# Patient Record
Sex: MALE | Race: BLACK OR AFRICAN AMERICAN
[De-identification: names, ages, dates, MRNs, and addresses within clinical notes are randomized per-mention and may not be internally consistent; named-entity substitution may affect disease eponyms.]

---

## 2019-03-28 ENCOUNTER — HOSPITAL ENCOUNTER (EMERGENCY)
Dept: HOSPITAL 74 - FER | Age: 29
Discharge: HOME | End: 2019-03-28
Payer: COMMERCIAL

## 2019-03-28 VITALS — HEART RATE: 85 BPM | SYSTOLIC BLOOD PRESSURE: 150 MMHG | DIASTOLIC BLOOD PRESSURE: 98 MMHG | TEMPERATURE: 98.5 F

## 2019-03-28 VITALS — BODY MASS INDEX: 33.2 KG/M2
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
## 2019-03-28 DIAGNOSIS — S43.402A: Primary | ICD-10-CM
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
## 2019-03-28 NOTE — PDOC
History of Present Illness





- General


Chief Complaint: Pain, Acute


Stated Complaint: LEFT SHOULDER PAIN


Time Seen by Provider: 03/28/19 09:48


History Source: Patient


Exam Limitations: No Limitations





- History of Present Illness


Initial Comments: 





03/28/19 10:14


29 yo male pmh s/p Achilles tendon repair (full rupture while playing basketball

) presents to the ED with 2 weeks of intermittent left shoulder pain that has 

increased in intensity and become constant over the last 4 days. Pt states he 

was playing basketball 1.5 weeks ago, when he had a fall and another player 

bumped the back of his shoulder leading to sudden onset worsening of pain 

however, pt was able to continue play basketball without difficulty. Pain is 

made worse with shoulder flexion past 90 degrees and tried NSAID medications 

yesterday which relieved pain. Denies coldness/numbness or weakness into the 

left lower arm or hand, denies warmth/swelling, recent illness, F/C/N/V 














Past History





- Past Medical History


Allergies/Adverse Reactions: 


 Allergies











Allergy/AdvReac Type Severity Reaction Status Date / Time


 


No Known Allergies Allergy   Verified 03/28/19 09:45











Home Medications: 


Ambulatory Orders





NK [No Known Home Medication]  03/28/19 








COPD: No





- Immunization History


Immunization Up to Date: No





- Suicide/Smoking/Psychosocial Hx


Smoking Status: No


Smoking History: Never smoked


Have you smoked in the past 12 months: No


Number of Cigarettes Smoked Daily: 0


Hx Alcohol Use: Yes (OCASIONAL)


Drug/Substance Use Hx: No


Substance Use Type: None, Alcohol





**Review of Systems





- Review of Systems


Constitutional: No: Chills, Fever


Respiratory: No: Shortness of Breath


Cardiac (ROS): No: Chest Pain


ABD/GI: No: Nausea, Vomiting


Musculoskeletal: Yes: Other (left shoulder pain)


Integumentary: No: Bruising, Change in Color


Neurological: No: Numbness, Tingling, Weakness





*Physical Exam





- Vital Signs


 Last Vital Signs











Temp Pulse Resp BP Pulse Ox


 


 98.5 F   85   20   150/98   100 


 


 03/28/19 09:45  03/28/19 09:45  03/28/19 09:45  03/28/19 09:45  03/28/19 09:45














- Physical Exam


General Appearance: Yes: Nourished, Appropriately Dressed.  No: Apparent 

Distress


HEENT: positive: EOMI


Neck: negative: Tender, Tender lateral, Tender midline


Respiratory/Chest: positive: Lungs Clear, Normal Breath Sounds.  negative: 

Accessory Muscle Use


Cardiovascular: positive: Regular Rhythm, Regular Rate, S1, S2.  negative: Edema

, JVD, Murmur


Vascular Pulses: Dorsalis-Pedis (R): 4+, Doralis-Pedis (L): 4+


Gastrointestinal/Abdominal: positive: Flat, Soft.  negative: Pulsatile Mass


Musculoskeletal: positive: Normal Inspection


Extremity: positive: Normal Capillary Refill.  negative: Normal Range of Motion 

(AROM reduced in shoulder flexion, however, PROM leads to equal ROM when 

compared to right arm), Tender


Integumentary: positive: Normal Color, Dry, Warm


Neurologic: positive: Fully Oriented, Alert, Normal Mood/Affect, Normal Response

, Motor Strength 5/5





Medical Decision Making





- Medical Decision Making





29 yo male presents to ED with worsening left shoulder pain with shoulder 

flexion. AROM reduced due to pain however, PROM pt able to match R shoulder and 

arm actions without difficulty or pain. Neurovascularly intact, strength equal 

bilaterally, no deformities visualized or palpable on exam





Vitals stable 





AOX3, NAD





Discussed possibility of x ray for shoulder with pt, combined decision making 

led to no x ray to be taken on todays visit. Pt likely suffering from a muscle 

strain/sprain and x ray will not assist in diagnosis. Pt agrees with plan to 

rest the arm, ice, continue taking NSAIDs for pain and slowly stretch/exercise 

the arm. If pain and function continues to be a concern, an Orthopedic Doctor 

was referred to pt





Pt agrees with and understands plan








*DC/Admit/Observation/Transfer


Diagnosis at time of Disposition: 


Left shoulder strain


Qualifiers:


 Encounter type: initial encounter Qualified Code(s): S46.912A - Strain of 

unspecified muscle, fascia and tendon at shoulder and upper arm level, left arm

, initial encounter








- Discharge Dispostion


Disposition: HOME


Condition at time of disposition: Good


Decision to Admit order: No





- Referrals


Referrals: 


Brandin Gonzales DO [Staff Physician] - 





- Patient Instructions


Printed Discharge Instructions:  DI for Shoulder Pain


Additional Instructions: 


Please see your Primary Doctor within the next week. Call the Orthopedic 

surgeon referred to you to set up an appointment if the pain and limited range 

of motion does not improve. Return to the ER for newe or concerning symptoms 

including but not limited to: high fevers, warmth to the arm, changes in 

sensation or weakness in the arm and hand, inability to move the left shoulder 

and arm. Continue taking over the counter NSAID medication as needed and 

directed on the packaging for pain along with ice. Thank you





- Post Discharge Activity

## 2019-03-28 NOTE — PDOC
Attending Attestation





- Resident


Resident Name: Jayro Ly





- ED Attending Attestation


I have performed the following: I have examined & evaluated the patient, The 

case was reviewed & discussed with the resident, I agree w/resident's findings 

& plan, Exceptions are as noted





- HPI


HPI: 





03/28/19 10:10


28y M no pmhx, presents with complaint of L shoulder pain for the past 2 weeks.

  Was playing basketball 4 days ago when he fell (fell on his behind, fall 

broke by his r hand) and has been having more frequent pain on his L shoulder. 

The pain is exacerbated by abducting his L shoulder. it is a sharp, 

nonradiating pain, no other pain anywhere else including his chest, neck, back.

  no associated fever/chills, numbness/tingling/weakness.  took alleve with 

improvement of his pain last night.  














- Physicial Exam


PE: 





03/28/19 10:36


General: no acute distress, well appearing, well nourished


MSK: no focal bony or soft tissue ttp to his L shoulder, humerus, neck/cervical 

region. normal passive ROM of his L shoulder, pulses and sensation symmetric 

bilatearlly. pain reproduced with abduction of his L shoulder anteriorly and 

laterally.











- Medical Decision Making





03/28/19 10:36


suspect msk cause - possible rotator cuff related


no suspicion of fracture or dislocation


will defer xray as do not think is necessary


will recommend NSAIDS, rest, pmd/ortho fu





return precautions were discussed

## 2020-10-02 ENCOUNTER — HOSPITAL ENCOUNTER (EMERGENCY)
Dept: HOSPITAL 74 - FER | Age: 30
Discharge: HOME | End: 2020-10-02
Payer: COMMERCIAL

## 2020-10-02 VITALS — TEMPERATURE: 98.7 F | HEART RATE: 63 BPM | DIASTOLIC BLOOD PRESSURE: 85 MMHG | SYSTOLIC BLOOD PRESSURE: 135 MMHG

## 2020-10-02 VITALS — BODY MASS INDEX: 34.4 KG/M2

## 2020-10-02 DIAGNOSIS — S60.10XA: Primary | ICD-10-CM

## 2020-10-02 DIAGNOSIS — S62.525A: ICD-10-CM

## 2020-10-02 NOTE — PDOC
History of Present Illness





- General


Chief Complaint: Injury


Stated Complaint: RIGHT THUMB INJURY


Time Seen by Provider: 10/02/20 11:43


History Source: Patient


Exam Limitations: No Limitations





- History of Present Illness


Initial Comments: 





10/02/20 11:49


30YOM without significant PMH who p/w right thumbnail injury sustained 5 days 

ago when he slammed the thumb in a car door. Did not sustain any other injuries,

did not fall or lose conscious. No breaks in his skin as a result of this. 

States only that it was throbbing the first night, has improved since that time 

but still fair amount of residual pain. He notes there was blood collection 

under the thumbnail immediately. Denies numbness, tingling, weakness, inability 

to move the thumb or any part of his body.





Past History





- Medical History


Allergies/Adverse Reactions: 


                                    Allergies











Allergy/AdvReac Type Severity Reaction Status Date / Time


 


No Known Allergies Allergy   Verified 10/02/20 11:42











Home Medications: 


Ambulatory Orders





NK [No Known Home Medication]  03/28/19 








COPD: No





- Immunization History


Immunization Up to Date: No





- Psycho-Social/Smoking History


Smoking Status: No


Smoking History: Never smoked


Have you smoked in the past 12 months: No


Number of Cigarettes Smoked Daily: 0





- Substance Abuse Hx (Audit-C & DAST Scrn)


How often the patient has a drink containing alcohol: 2-4 times / month


How often the patient has six or more drinks on one occasion: Never


Score: In Men: 4 or > Positive; In Women: 3 or > Positive: 2


Screen Result (Pos requires Nsg. Audit-10AR): Negative


In the last yr the pt used illegal drug/Rx for NonMed reason: No


Score:  Yes response is considered Positive: 0


Screen Result (Positive result requires Nsg. DAST-10): Negative





**Review of Systems





- Review of Systems


Able to Perform ROS?: Yes


Comments:: 





10/02/20 11:51


GEN: no fever, chills, malaise, or generalized weakness


HEENT: no ear pain, congestion, sore throat, vision change, or eye pain


CV: no chest pain, palpitations, lightheadedness, syncope, or edema


RESP: no SOB, wheezing, or cough


GI: no abdominal pain, nausea, vomiting, diarrhea, constipation, or rectal bleed


: no dysuria, hematuria, or discharge 


MSK: right thumb injury, otherwise no muscle weakness or pain, no joint swelling

or pain


NEURO: no headache, vertigo, numbness, tingling, or focal weakness


PSYCH: no SI, HI, or behavior change


SKIN: no jaundice, rash, lesions, or unexplained bruises


ROS otherwise negative except as noted in HPI








*Physical Exam





- Vital Signs


                                Last Vital Signs











Temp Pulse Resp BP Pulse Ox


 


 98.7 F   63   15   135/85   100 


 


 10/02/20 11:36  10/02/20 11:36  10/02/20 11:36  10/02/20 11:36  10/02/20 11:36














- Physical Exam





10/02/20 11:52


GENERAL: well-appearing, A/Ox4, no distress, answers questions appropriately, 

pleasant


HEENT: PERRLA, EOMI, moist mucous membranes


NECK/BACK: no midline ttp, no spinal step-off or deformity, no hematoma, full 

ROM, neck supple


CARDIOVASCULAR: regular rate/rhythm, no MGR, strong peripheral pulses, capillary

refill <2 seconds, extremities wwp, no edema


LUNGS/RESPIRATORY: no respiratory distress, CTAB


GI/ABDOMEN: symmetric side-to-side, normoactive BS, soft, no ttp, no midline 

pulsatile masses


: no CVA tenderness


MSK/EXTREMITIES: right proximal thumbnail 25% subungal hematoma without any 

laceration, skin tear, abrasion, or other e/o trauma to the area, no muscle 

atrophy, no acute deformity


SKIN: right thumbnailwarm and dry, no pallor, no jaundice, no rash, no 

pathologic-appearing bruising, no skin breakdown, no cuts, no lesions


NEUROLOGICAL: GCS 15, CN II-XII grossly intact, 5/5 strength proximally and 

distally, no facial droop








ED Treatment Course





- RADIOLOGY


Radiology Studies Ordered: 














 Category Date Time Status


 


 FINGER(S) RIGHT [RAD] Stat Radiology  10/02/20 11:48 Ordered














Medical Decision Making





- Medical Decision Making





10/02/20 11:55


30YOM has subungal hematoma from slamming thumb in car door. 5 days ago





                               Initial Vital Signs











Temp Pulse Resp BP Pulse Ox


 


 98.7 F   63   15   135/85   100 


 


 10/02/20 11:36  10/02/20 11:36  10/02/20 11:36  10/02/20 11:36  10/02/20 11:36








Most likely uncomplicated sububgal hematoma. Less likely any fracture or NV i

njury as R/M/U nerve distributions are intact, no skin disruption, ROM is 

normal. This injury is not likely to benefit from trepination mandy given that the

 injury occurred >48 hours ago. Will give Tylenol for pain control, thumb XR to 

r/o fracture, and dispo home with close outpatient follow-up.





                                 Provider Orders











 Category Date Time Status


 


 Acetaminophen [Tylenol -] Medication  10/02/20 12:18 Discontinued





 1,000 mg .ROUTE .STK-MED ONE   


 


 Acetaminophen [Tylenol -] Medication  10/02/20 11:48 Discontinued





 975 mg PO ONCE ONE   


 


 FINGER(S) RIGHT [RAD] Stat Radiology  10/02/20 11:48 Completed








                                   Medications














Discontinued Medications














Generic Name Dose Route Start Last Admin





  Trade Name Freq  PRN Reason Stop Dose Admin


 


Acetaminophen  975 mg  10/02/20 11:48  10/02/20 12:20





  Tylenol -  PO  10/02/20 11:49  975 mg





  ONCE ONE   Administration


 


Acetaminophen  Confirm  10/02/20 12:18 





  Tylenol -  Administered  10/02/20 12:19 





  Dose  





  1,000 mg  





  .ROUTE  





  .STK-MED ONE  











RAD/FINGER(S) RIGHT Right thumb: Injury. Pain. The right thumb submitted. There 

is a fracture through the base of the distal phalanx of the right thumb. This 

could be old as the fragment appears corticated. Correlation at the site 

suggested. Orthopedic follow-up may be of help. The other bones are intact 





There is minor distal phalanx fracture, nondisplaced and not significantly 

angulated. Patient still has full ROM but this may explain the continued pain he

 has been having. He is appropriate for discharge home and close outpatient f/u.

 Specific return precautions are discussed as noted in discharge instructions. 

He is given referral information to orthopedics clinic.





Discharge





- Discharge Information


Problems reviewed: Yes


Clinical Impression/Diagnosis: 


 Subungual hematoma





Thumb fracture


Qualifiers:


 Encounter type: initial encounter Fracture type: closed Phalanx: distal 

Fracture alignment: nondisplaced Laterality: left Qualified Code(s): S62.525A - 

Nondisplaced fracture of distal phalanx of left thumb, initial encounter for 

closed fracture





Condition: Good


Disposition: HOME





- Admission


No





- Follow up/Referral


Referrals: 


Paulie Ring MD [Staff Physician] - 


Johnson Samson MD [Staff Physician] - 





- Patient Discharge Instructions


Additional Instructions: 


You were seen in the ER for a nailbed injury which is called subungual hematoma.

These can be quite painful but they will resolve on their own. There was also a 

small fracture at the base of your thumb, for which you should follow up with an

orthopedist. Please take Tylenol and Motrin as needed at home, following the 

instructions on the label, and you can also ice the area. Follow up with your 

regular doctor within 3 days. Also follow up with the orthopedist (we are giving

you referral information). Return to the ER for any new or worsening symptoms, 

especially inability to move the thumb, worsening pain, fever, or other 

emergency concerns.





- Post Discharge Activity

## 2021-03-17 ENCOUNTER — HOSPITAL ENCOUNTER (EMERGENCY)
Dept: HOSPITAL 74 - FER | Age: 31
Discharge: HOME | End: 2021-03-17
Payer: COMMERCIAL

## 2021-03-17 VITALS — DIASTOLIC BLOOD PRESSURE: 99 MMHG | TEMPERATURE: 98.3 F | SYSTOLIC BLOOD PRESSURE: 141 MMHG | HEART RATE: 83 BPM

## 2021-03-17 VITALS — BODY MASS INDEX: 33.3 KG/M2

## 2021-03-17 DIAGNOSIS — S63.614A: Primary | ICD-10-CM

## 2021-11-20 ENCOUNTER — HOSPITAL ENCOUNTER (EMERGENCY)
Dept: HOSPITAL 74 - FER | Age: 31
Discharge: HOME | End: 2021-11-20
Payer: COMMERCIAL

## 2021-11-20 VITALS — HEART RATE: 78 BPM | TEMPERATURE: 98.4 F | SYSTOLIC BLOOD PRESSURE: 130 MMHG | DIASTOLIC BLOOD PRESSURE: 80 MMHG

## 2021-11-20 VITALS — BODY MASS INDEX: 33.3 KG/M2
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
## 2021-11-20 DIAGNOSIS — S63.501A: Primary | ICD-10-CM

## 2022-08-14 ENCOUNTER — HOSPITAL ENCOUNTER (EMERGENCY)
Dept: HOSPITAL 74 - FER | Age: 32
Discharge: HOME | End: 2022-08-14
Payer: COMMERCIAL

## 2022-08-14 VITALS
DIASTOLIC BLOOD PRESSURE: 82 MMHG | SYSTOLIC BLOOD PRESSURE: 134 MMHG | HEART RATE: 74 BPM | TEMPERATURE: 98.9 F | RESPIRATION RATE: 18 BRPM

## 2022-08-14 VITALS — BODY MASS INDEX: 33.3 KG/M2
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
## 2022-08-14 DIAGNOSIS — S93.402A: Primary | ICD-10-CM

## 2023-10-20 ENCOUNTER — HOSPITAL ENCOUNTER (EMERGENCY)
Dept: HOSPITAL 74 - FER | Age: 33
Discharge: HOME | End: 2023-10-20
Payer: COMMERCIAL

## 2023-10-20 VITALS
RESPIRATION RATE: 16 BRPM | SYSTOLIC BLOOD PRESSURE: 137 MMHG | HEART RATE: 65 BPM | DIASTOLIC BLOOD PRESSURE: 99 MMHG | TEMPERATURE: 98.7 F

## 2023-10-20 VITALS — BODY MASS INDEX: 33.3 KG/M2

## 2023-10-20 DIAGNOSIS — M25.511: Primary | ICD-10-CM
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
## 2023-10-20 DIAGNOSIS — S46.911A: ICD-10-CM

## 2025-04-09 ENCOUNTER — HOSPITAL ENCOUNTER (OUTPATIENT)
Dept: HOSPITAL 74 - FASU | Age: 35
Discharge: HOME | End: 2025-04-09
Attending: ORTHOPAEDIC SURGERY
Payer: COMMERCIAL

## 2025-04-09 VITALS — TEMPERATURE: 97.3 F | RESPIRATION RATE: 18 BRPM

## 2025-04-09 VITALS — HEART RATE: 88 BPM | SYSTOLIC BLOOD PRESSURE: 146 MMHG | DIASTOLIC BLOOD PRESSURE: 94 MMHG

## 2025-04-09 VITALS — BODY MASS INDEX: 32.9 KG/M2

## 2025-04-09 DIAGNOSIS — S86.011A: Primary | ICD-10-CM
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
## 2025-04-09 PROCEDURE — C1713 ANCHOR/SCREW BN/BN,TIS/BN: HCPCS

## 2025-04-09 PROCEDURE — 0LQN0ZZ REPAIR RIGHT LOWER LEG TENDON, OPEN APPROACH: ICD-10-PCS | Performed by: ORTHOPAEDIC SURGERY

## 2025-04-09 RX ADMIN — ACETAMINOPHEN ONE MG: 10 INJECTION, SOLUTION INTRAVENOUS at 13:05
